# Patient Record
Sex: MALE | Race: WHITE | Employment: OTHER | URBAN - METROPOLITAN AREA
[De-identification: names, ages, dates, MRNs, and addresses within clinical notes are randomized per-mention and may not be internally consistent; named-entity substitution may affect disease eponyms.]

---

## 2017-02-08 ENCOUNTER — HOSPITAL ENCOUNTER (OUTPATIENT)
Dept: CT IMAGING | Facility: CLINIC | Age: 62
Discharge: HOME | End: 2017-02-08
Attending: FAMILY MEDICINE

## 2017-02-08 ENCOUNTER — HOSPITAL ENCOUNTER (OUTPATIENT)
Dept: CT IMAGING | Facility: CLINIC | Age: 62
Discharge: HOME | End: 2017-02-08

## 2017-02-08 DIAGNOSIS — R10.9 LEFT FLANK PAIN: ICD-10-CM

## 2017-02-20 ASSESSMENT — MIFFLIN-ST. JEOR: SCORE: 1705.72

## 2017-02-21 ENCOUNTER — ANESTHESIA - HEALTHEAST (OUTPATIENT)
Dept: SURGERY | Facility: HOSPITAL | Age: 62
End: 2017-02-21

## 2017-02-21 ASSESSMENT — MIFFLIN-ST. JEOR: SCORE: 1705.72

## 2017-02-22 ENCOUNTER — SURGERY - HEALTHEAST (OUTPATIENT)
Dept: SURGERY | Facility: HOSPITAL | Age: 62
End: 2017-02-22

## 2017-04-11 ENCOUNTER — RECORDS - HEALTHEAST (OUTPATIENT)
Dept: LAB | Facility: CLINIC | Age: 62
End: 2017-04-11

## 2017-04-11 LAB
CHOLEST SERPL-MCNC: 100 MG/DL
FASTING STATUS PATIENT QL REPORTED: YES
HDLC SERPL-MCNC: 33 MG/DL
LDLC SERPL CALC-MCNC: 55 MG/DL
TRIGL SERPL-MCNC: 59 MG/DL

## 2017-10-10 ENCOUNTER — RECORDS - HEALTHEAST (OUTPATIENT)
Dept: LAB | Facility: CLINIC | Age: 62
End: 2017-10-10

## 2017-10-10 LAB
CHOLEST SERPL-MCNC: 130 MG/DL
FASTING STATUS PATIENT QL REPORTED: YES
HDLC SERPL-MCNC: 36 MG/DL
LDLC SERPL CALC-MCNC: 72 MG/DL
PSA SERPL-MCNC: 3.5 NG/ML (ref 0–4.5)
TRIGL SERPL-MCNC: 109 MG/DL

## 2017-10-24 ENCOUNTER — RECORDS - HEALTHEAST (OUTPATIENT)
Dept: ADMINISTRATIVE | Facility: OTHER | Age: 62
End: 2017-10-24

## 2018-06-12 ENCOUNTER — RECORDS - HEALTHEAST (OUTPATIENT)
Dept: LAB | Facility: CLINIC | Age: 63
End: 2018-06-12

## 2018-06-12 LAB — TSH SERPL DL<=0.005 MIU/L-ACNC: 44.64 UIU/ML (ref 0.3–5)

## 2018-09-05 ENCOUNTER — RECORDS - HEALTHEAST (OUTPATIENT)
Dept: LAB | Facility: CLINIC | Age: 63
End: 2018-09-05

## 2018-09-05 LAB
ALBUMIN SERPL-MCNC: 3.2 G/DL (ref 3.5–5)
ALBUMIN UR-MCNC: ABNORMAL MG/DL
ALP SERPL-CCNC: 89 U/L (ref 45–120)
ALT SERPL W P-5'-P-CCNC: 24 U/L (ref 0–45)
ANION GAP SERPL CALCULATED.3IONS-SCNC: 9 MMOL/L (ref 5–18)
APPEARANCE UR: ABNORMAL
AST SERPL W P-5'-P-CCNC: 16 U/L (ref 0–40)
BACTERIA #/AREA URNS HPF: ABNORMAL HPF
BILIRUB SERPL-MCNC: 0.3 MG/DL (ref 0–1)
BILIRUB UR QL STRIP: NEGATIVE
BUN SERPL-MCNC: 11 MG/DL (ref 8–22)
CALCIUM SERPL-MCNC: 9 MG/DL (ref 8.5–10.5)
CAOX CRY #/AREA URNS HPF: PRESENT /[HPF]
CHLORIDE BLD-SCNC: 104 MMOL/L (ref 98–107)
CHOLEST SERPL-MCNC: 131 MG/DL
CO2 SERPL-SCNC: 30 MMOL/L (ref 22–31)
COLOR UR AUTO: YELLOW
CREAT SERPL-MCNC: 0.67 MG/DL (ref 0.7–1.3)
FASTING STATUS PATIENT QL REPORTED: NORMAL
GFR SERPL CREATININE-BSD FRML MDRD: >60 ML/MIN/1.73M2
GLUCOSE BLD-MCNC: 92 MG/DL (ref 70–125)
GLUCOSE UR STRIP-MCNC: NEGATIVE MG/DL
HDLC SERPL-MCNC: 57 MG/DL
HGB UR QL STRIP: NEGATIVE
HYALINE CASTS #/AREA URNS LPF: ABNORMAL LPF
KETONES UR STRIP-MCNC: ABNORMAL MG/DL
LDLC SERPL CALC-MCNC: 55 MG/DL
LEUKOCYTE ESTERASE UR QL STRIP: ABNORMAL
MUCOUS THREADS #/AREA URNS LPF: ABNORMAL LPF
NITRATE UR QL: NEGATIVE
PH UR STRIP: 5.5 [PH] (ref 4.5–8)
POTASSIUM BLD-SCNC: 3.8 MMOL/L (ref 3.5–5)
PROT SERPL-MCNC: 5.7 G/DL (ref 6–8)
PSA SERPL-MCNC: 1.8 NG/ML (ref 0–4.5)
RBC #/AREA URNS AUTO: ABNORMAL HPF
SODIUM SERPL-SCNC: 143 MMOL/L (ref 136–145)
SP GR UR STRIP: 1.04 (ref 1–1.03)
SQUAMOUS #/AREA URNS AUTO: ABNORMAL LPF
TRIGL SERPL-MCNC: 95 MG/DL
TSH SERPL DL<=0.005 MIU/L-ACNC: 13.99 UIU/ML (ref 0.3–5)
UROBILINOGEN UR STRIP-ACNC: ABNORMAL
WBC #/AREA URNS AUTO: ABNORMAL HPF

## 2018-10-05 ENCOUNTER — RECORDS - HEALTHEAST (OUTPATIENT)
Dept: LAB | Facility: CLINIC | Age: 63
End: 2018-10-05

## 2018-10-05 LAB
PSA SERPL-MCNC: 1.2 NG/ML (ref 0–4.5)
TSH SERPL DL<=0.005 MIU/L-ACNC: 8.93 UIU/ML (ref 0.3–5)

## 2018-11-09 ENCOUNTER — HOSPITAL ENCOUNTER (OUTPATIENT)
Dept: ULTRASOUND IMAGING | Facility: CLINIC | Age: 63
Discharge: HOME | End: 2018-11-09
Attending: INTERNAL MEDICINE | Admitting: INTERNAL MEDICINE
Payer: COMMERCIAL

## 2018-11-09 DIAGNOSIS — M79.661 BILATERAL CALF PAIN: ICD-10-CM

## 2018-11-09 DIAGNOSIS — R60.9 SWOLLEN: ICD-10-CM

## 2018-11-09 DIAGNOSIS — Z86.718 HISTORY OF DVT (DEEP VEIN THROMBOSIS): ICD-10-CM

## 2018-11-09 DIAGNOSIS — M79.662 BILATERAL CALF PAIN: ICD-10-CM

## 2018-11-09 DIAGNOSIS — L53.9 REDNESS: ICD-10-CM

## 2018-11-09 PROCEDURE — 93970 EXTREMITY STUDY: CPT

## 2018-11-12 ENCOUNTER — TRANSFERRED RECORDS (OUTPATIENT)
Dept: HEALTH INFORMATION MANAGEMENT | Facility: CLINIC | Age: 63
End: 2018-11-12

## 2018-11-14 ENCOUNTER — HOSPITAL ENCOUNTER (OUTPATIENT)
Dept: CARDIOLOGY | Facility: CLINIC | Age: 63
Discharge: HOME | End: 2018-11-14
Attending: INTERNAL MEDICINE | Admitting: INTERNAL MEDICINE
Payer: COMMERCIAL

## 2018-11-14 DIAGNOSIS — R60.9 EDEMA, UNSPECIFIED TYPE: ICD-10-CM

## 2018-11-14 DIAGNOSIS — Z85.118 HX OF CANCER OF LUNG: ICD-10-CM

## 2018-11-14 PROCEDURE — 93306 TTE W/DOPPLER COMPLETE: CPT | Mod: 26 | Performed by: INTERNAL MEDICINE

## 2018-11-14 PROCEDURE — 0399T ECHO COMPLETE: CPT

## 2018-11-23 ENCOUNTER — RECORDS - HEALTHEAST (OUTPATIENT)
Dept: LAB | Facility: CLINIC | Age: 63
End: 2018-11-23

## 2018-11-23 LAB
ALBUMIN SERPL-MCNC: 3.4 G/DL (ref 3.5–5)
ALP SERPL-CCNC: 105 U/L (ref 45–120)
ALT SERPL W P-5'-P-CCNC: 19 U/L (ref 0–45)
ANION GAP SERPL CALCULATED.3IONS-SCNC: 10 MMOL/L (ref 5–18)
AST SERPL W P-5'-P-CCNC: 21 U/L (ref 0–40)
BILIRUB SERPL-MCNC: 1 MG/DL (ref 0–1)
BNP SERPL-MCNC: 18 PG/ML (ref 0–53)
BUN SERPL-MCNC: 8 MG/DL (ref 8–22)
CALCIUM SERPL-MCNC: 9.4 MG/DL (ref 8.5–10.5)
CHLORIDE BLD-SCNC: 103 MMOL/L (ref 98–107)
CHOLEST SERPL-MCNC: 151 MG/DL
CO2 SERPL-SCNC: 28 MMOL/L (ref 22–31)
CREAT SERPL-MCNC: 0.68 MG/DL (ref 0.7–1.3)
FASTING STATUS PATIENT QL REPORTED: NORMAL
GFR SERPL CREATININE-BSD FRML MDRD: >60 ML/MIN/1.73M2
GLUCOSE BLD-MCNC: 101 MG/DL (ref 70–125)
HDLC SERPL-MCNC: 49 MG/DL
LDLC SERPL CALC-MCNC: 76 MG/DL
POTASSIUM BLD-SCNC: 3.9 MMOL/L (ref 3.5–5)
PROT SERPL-MCNC: 5.8 G/DL (ref 6–8)
SODIUM SERPL-SCNC: 141 MMOL/L (ref 136–145)
TRIGL SERPL-MCNC: 130 MG/DL
TSH SERPL DL<=0.005 MIU/L-ACNC: 6.32 UIU/ML (ref 0.3–5)

## 2018-12-07 ENCOUNTER — RECORDS - HEALTHEAST (OUTPATIENT)
Dept: ADMINISTRATIVE | Facility: OTHER | Age: 63
End: 2018-12-07

## 2018-12-07 ENCOUNTER — RECORDS - HEALTHEAST (OUTPATIENT)
Dept: LAB | Facility: CLINIC | Age: 63
End: 2018-12-07

## 2018-12-07 LAB
ALBUMIN SERPL-MCNC: 3.3 G/DL (ref 3.5–5)
ANION GAP SERPL CALCULATED.3IONS-SCNC: 9 MMOL/L (ref 5–18)
BUN SERPL-MCNC: 7 MG/DL (ref 8–22)
CALCIUM SERPL-MCNC: 9.1 MG/DL (ref 8.5–10.5)
CHLORIDE BLD-SCNC: 98 MMOL/L (ref 98–107)
CO2 SERPL-SCNC: 31 MMOL/L (ref 22–31)
CREAT SERPL-MCNC: 0.69 MG/DL (ref 0.7–1.3)
GFR SERPL CREATININE-BSD FRML MDRD: >60 ML/MIN/1.73M2
GLUCOSE BLD-MCNC: 102 MG/DL (ref 70–125)
PHOSPHATE SERPL-MCNC: 2.7 MG/DL (ref 2.5–4.5)
POTASSIUM BLD-SCNC: 4 MMOL/L (ref 3.5–5)
SODIUM SERPL-SCNC: 138 MMOL/L (ref 136–145)

## 2018-12-12 ENCOUNTER — AMBULATORY - HEALTHEAST (OUTPATIENT)
Dept: VASCULAR SURGERY | Facility: CLINIC | Age: 63
End: 2018-12-12

## 2018-12-12 DIAGNOSIS — R60.0 EDEMA LEG: ICD-10-CM

## 2018-12-18 ENCOUNTER — HOSPITAL ENCOUNTER (OUTPATIENT)
Dept: OCCUPATIONAL THERAPY | Facility: CLINIC | Age: 63
Setting detail: THERAPIES SERIES
End: 2018-12-18
Attending: INTERNAL MEDICINE
Payer: COMMERCIAL

## 2018-12-18 PROCEDURE — 97166 OT EVAL MOD COMPLEX 45 MIN: CPT | Mod: GO

## 2018-12-18 PROCEDURE — 97535 SELF CARE MNGMENT TRAINING: CPT | Mod: GO

## 2018-12-18 NOTE — PROGRESS NOTES
12/18/18 0900   Rehab Discipline   Discipline OT   Type of Visit   Type of visit Initial Edema Evaluation   General Information   Start of care 12/18/18   Referring physician Dr. Ziegler   Orders Evaluate and treat as indicated   Order date 11/13/18   Medical diagnosis non small cell lung cancer   Onset of illness / date of surgery 11/13/18   Edema onset 11/13/18   Affected body parts LLE;RLE;Trunk   Edema etiology Radiation;Chemo   Radiation comments Pt sustained radiation ealeier this year but was stopped 2/2 pneumitis. Pt stoppe    Chemotherapy comments Pt and spouse report 2 weeks chenotherapy earlier this year and was stopped 2/2 progression disease to his LN's and lymphatic system; 2/2 chemotherapy not working was discontinued for time being   Pertinent history of current problem (PT: include personal factors and/or comorbidities that impact the POC; OT: include additional occupational profile info) PMH significant for lung cancer, hernia, and thyroid issues.   Surgical / medical history reviewed Yes   Prior level of functional mobility I   Prior treatment Compression garments;Elevation;Diuretics   Community support Family / friend caregiver   Patient role / employment history Retired   Living environment [place] [facility]   General observations Pt on supplemntal O2   Fall Risk Screen   Fall screen completed by OT   Have you fallen 2 or more times in the past year? No   Have you fallen and had an injury in the past year? No   Is patient a fall risk? No   System Outcome Measures   Lymphedema Life Impact Scale (score range 0-72). A higher score indicates greater impairment. 40   Subjective Report   Patient report of symptoms limited movement at waist; heaviness in legs; hard to walk   Pain   Pain comments Pt has back and rib pain from tumor on his R side   Cognitive Status   Orientation Orientation to person, place and time   Level of consciousness Alert   Follows commands and answers questions 100% of the time    Personal safety and judgement Intact   Edema Exam / Assessment   Skin condition Pitting;Dryness;Intact   Skin condition comments 2-3+ pitting foot-mid calf; 2+ pitting mid calf- distal thigh; 1+ distal thigh to hip   Pitting 1+;2+;3+   Pitting location BLE's   Capillary refill Symmetrical   Dorsal pedal pulse comments unable to plapate-no signs ischemia noted   Stemmer sign Negative   Girth Measurements   Girth Measurements (will obtain upon return for services)   Range of Motion   ROM comments WFL   Strength   Strength comments NT'd   Activities of Daily Living   Activities of Daily Living I-Min A   Bed Mobility   Bed mobility I   Transfers   Transfers I   Gait / Locomotion   Gait / Locomotion I   Sensory   Sensory perception comments no neuropathy reported or identified   Coordination   Coordination Gross motor coordination appropriate   Muscle Tone   Muscle tone No deficits were identified   Planned Edema Interventions   Planned edema interventions Manual lymph drainage;Gradient compression bandaging;Fit for compression garment;Exercises;Precautions to prevent infection / exacerbation;Education;Skin care / precautions;Home management program development   Clinical Impression   Criteria for skilled therapeutic intervention met Yes   Therapy diagnosis lymphedema   Influenced by the following impairments / conditions Stage 1   Assessment of Occupational Performance 3-5 Performance Deficits   Identified Performance Deficits decreased functional mobility; decreased I with LB dressing; decreased I with functional transfers; decreased I with LB bathing   Clinical Decision Making (Complexity) Moderate complexity   Treatment frequency 3 times / week   Treatment duration 3x/wk x 2 weeks, then 0x/wk x 3 weeks, then 1x/wk x 1 week-just compression and ther ex; 3x/wk x 4 weeks, then 0x/wk x 3 weeks, then 1x/wk x 1 week if GCB + ther ex + MLD   Patient / family and/or staff in agreement with plan of care Yes   Risks and  benefits of therapy have been explained Yes   Clinical impression comments Pt will benefit from skilled lymphedema services to reduce BLE/trunkal lymphedema to aide improvements in LB cares, funcitonal mobility, and transfer I building.   Goals   Edema Eval Goals 1;2;3;4;5;6   Goal 1   Goal identifier 1   Goal description In order to improve functional mobility for activities of living, by the completion of intensive treatment,  patient and/or caregiver will:   Target date 03/01/19   Goal 2   Goal identifier 1a   Goal description tolerate gradient compression bandaging/wearing compression garments 23 hrs/day to prevent re-acccumulation of extracellular fluid for reductions needed to aide improvements in LB cares, functional mobility, and transfer Ind   Target date 03/01/19   Goal 3   Goal identifier 1b   Goal description demonstrate independence in applying gradient compression bandages to build I with home management of BLE/trunkal lymphedema needed to aide improvements in transfers, mobility, and LB cares   Target date 03/01/19   Goal 4   Goal identifier 1c   Goal description demonstrate independence in performing prescribed exercises to facilate the lymph system and muscle pumping system for max reductions needed to aide imporvements in functional mobility, transfers, and LB cares   Target date 03/01/19   Goal 5   Goal identifier 1d   Goal description be independent in donning/doffing, wearing schedule, and care of compression garments for I building with home management of BLE/trunkal lymphedema needed to aide improvements in mobility, LB cares, and transfer Ind   Target date 03/01/19   Goal 6   Goal identifier 2   Goal description Display total BLE volume reduction of 1L+ for reductions needed to aide improvements in mobility, LB cares, and functional transfers   Target date 03/01/19   Total Evaluation Time   OT Leon Moderate Complexity Minutes (52438) 26

## 2018-12-26 ENCOUNTER — COMMUNICATION - HEALTHEAST (OUTPATIENT)
Dept: VASCULAR SURGERY | Facility: CLINIC | Age: 63
End: 2018-12-26

## 2019-01-17 ENCOUNTER — HOME CARE/HOSPICE - HEALTHEAST (OUTPATIENT)
Dept: HOSPICE | Facility: HOSPICE | Age: 64
End: 2019-01-17

## 2019-01-18 ENCOUNTER — HOME CARE/HOSPICE - HEALTHEAST (OUTPATIENT)
Dept: HOSPICE | Facility: HOSPICE | Age: 64
End: 2019-01-18

## 2019-01-21 ENCOUNTER — RECORDS - HEALTHEAST (OUTPATIENT)
Dept: LAB | Facility: CLINIC | Age: 64
End: 2019-01-21

## 2019-01-21 LAB
ALBUMIN SERPL-MCNC: 2.3 G/DL (ref 3.5–5)
ALP SERPL-CCNC: 107 U/L (ref 45–120)
ALT SERPL W P-5'-P-CCNC: 15 U/L (ref 0–45)
ANION GAP SERPL CALCULATED.3IONS-SCNC: 12 MMOL/L (ref 5–18)
AST SERPL W P-5'-P-CCNC: 14 U/L (ref 0–40)
BILIRUB SERPL-MCNC: 0.3 MG/DL (ref 0–1)
BUN SERPL-MCNC: 7 MG/DL (ref 8–22)
CALCIUM SERPL-MCNC: 9 MG/DL (ref 8.5–10.5)
CHLORIDE BLD-SCNC: 102 MMOL/L (ref 98–107)
CO2 SERPL-SCNC: 27 MMOL/L (ref 22–31)
CREAT SERPL-MCNC: 0.62 MG/DL (ref 0.7–1.3)
GFR SERPL CREATININE-BSD FRML MDRD: >60 ML/MIN/1.73M2
GLUCOSE BLD-MCNC: 101 MG/DL (ref 70–125)
POTASSIUM BLD-SCNC: 3.9 MMOL/L (ref 3.5–5)
PROT SERPL-MCNC: 5.7 G/DL (ref 6–8)
SODIUM SERPL-SCNC: 141 MMOL/L (ref 136–145)
TSH SERPL DL<=0.005 MIU/L-ACNC: 0.8 UIU/ML (ref 0.3–5)

## 2019-01-22 ENCOUNTER — HOME CARE/HOSPICE - HEALTHEAST (OUTPATIENT)
Dept: HOSPICE | Facility: HOSPICE | Age: 64
End: 2019-01-22

## 2019-01-30 ENCOUNTER — HOME CARE/HOSPICE - HEALTHEAST (OUTPATIENT)
Dept: HOSPICE | Facility: HOSPICE | Age: 64
End: 2019-01-30

## 2019-02-26 ENCOUNTER — HOSPITAL ENCOUNTER (OUTPATIENT)
Facility: CLINIC | Age: 64
End: 2019-02-26

## 2019-03-06 ENCOUNTER — AMBULATORY - HEALTHEAST (OUTPATIENT)
Dept: OTHER | Facility: CLINIC | Age: 64
End: 2019-03-06

## 2019-03-06 ENCOUNTER — DOCUMENTATION ONLY (OUTPATIENT)
Dept: OTHER | Facility: CLINIC | Age: 64
End: 2019-03-06

## 2019-03-12 ENCOUNTER — DOCUMENTATION ONLY (OUTPATIENT)
Dept: OTHER | Facility: CLINIC | Age: 64
End: 2019-03-12

## 2019-03-12 ENCOUNTER — AMBULATORY - HEALTHEAST (OUTPATIENT)
Dept: OTHER | Facility: CLINIC | Age: 64
End: 2019-03-12

## 2019-03-14 ENCOUNTER — MEDICAL CORRESPONDENCE (OUTPATIENT)
Dept: HEALTH INFORMATION MANAGEMENT | Facility: CLINIC | Age: 64
End: 2019-03-14

## 2019-06-08 ENCOUNTER — HOSPITAL ENCOUNTER (INPATIENT)
Facility: CLINIC | Age: 64
LOS: 2 days | Discharge: HOSPICE | End: 2019-06-11
Attending: EMERGENCY MEDICINE | Admitting: INTERNAL MEDICINE
Payer: COMMERCIAL

## 2019-06-08 DIAGNOSIS — C34.90 METASTATIC NON-SMALL CELL LUNG CANCER (H): ICD-10-CM

## 2019-06-08 DIAGNOSIS — Z51.5 HOSPICE CARE PATIENT: ICD-10-CM

## 2019-06-08 DIAGNOSIS — R45.1 AGITATION: ICD-10-CM

## 2019-06-08 PROCEDURE — 96375 TX/PRO/DX INJ NEW DRUG ADDON: CPT

## 2019-06-08 PROCEDURE — 99285 EMERGENCY DEPT VISIT HI MDM: CPT

## 2019-06-08 PROCEDURE — 96372 THER/PROPH/DIAG INJ SC/IM: CPT

## 2019-06-08 PROCEDURE — 96374 THER/PROPH/DIAG INJ IV PUSH: CPT

## 2019-06-08 RX ORDER — LORAZEPAM 2 MG/ML
1 INJECTION INTRAMUSCULAR
Status: COMPLETED | OUTPATIENT
Start: 2019-06-08 | End: 2019-06-09

## 2019-06-08 RX ORDER — HYDROMORPHONE HYDROCHLORIDE 1 MG/ML
.5-1 INJECTION, SOLUTION INTRAMUSCULAR; INTRAVENOUS; SUBCUTANEOUS
Status: DISCONTINUED | OUTPATIENT
Start: 2019-06-08 | End: 2019-06-09

## 2019-06-08 RX ORDER — CHLORPROMAZINE HYDROCHLORIDE 25 MG/ML
50 INJECTION INTRAMUSCULAR ONCE
Status: COMPLETED | OUTPATIENT
Start: 2019-06-08 | End: 2019-06-09

## 2019-06-08 ASSESSMENT — MIFFLIN-ST. JEOR: SCORE: 1409.54

## 2019-06-08 NOTE — LETTER
[place] [address] [facility]  [address]  [facility] [address]  Phone: [phone]  Fax: [phone]      June 9, 2019      RE: Ramez Frye  [address] [facility] [address]        To whom it may concern:    Ramez Frye is under my professional care. He is on hospice care and is terminally ill. I would encourage family members to visit him if possible.    Sincerely,    Karol Mckeon MD  Staff Physician

## 2019-06-09 ENCOUNTER — CARE COORDINATION (OUTPATIENT)
Dept: CARE COORDINATION | Facility: CLINIC | Age: 64
End: 2019-06-09

## 2019-06-09 PROBLEM — C34.90 METASTATIC LUNG CANCER (METASTASIS FROM LUNG TO OTHER SITE) (H): Status: ACTIVE | Noted: 2019-06-09

## 2019-06-09 PROCEDURE — 25000132 ZZH RX MED GY IP 250 OP 250 PS 637: Performed by: INTERNAL MEDICINE

## 2019-06-09 PROCEDURE — 25000128 H RX IP 250 OP 636: Performed by: EMERGENCY MEDICINE

## 2019-06-09 PROCEDURE — 25000125 ZZHC RX 250: Performed by: INTERNAL MEDICINE

## 2019-06-09 PROCEDURE — 99222 1ST HOSP IP/OBS MODERATE 55: CPT | Mod: AI | Performed by: INTERNAL MEDICINE

## 2019-06-09 PROCEDURE — 12000000 ZZH R&B MED SURG/OB

## 2019-06-09 PROCEDURE — 25800030 ZZH RX IP 258 OP 636: Performed by: INTERNAL MEDICINE

## 2019-06-09 PROCEDURE — 25000128 H RX IP 250 OP 636: Performed by: INTERNAL MEDICINE

## 2019-06-09 RX ORDER — ONDANSETRON 4 MG/1
4 TABLET, ORALLY DISINTEGRATING ORAL EVERY 6 HOURS PRN
Status: DISCONTINUED | OUTPATIENT
Start: 2019-06-09 | End: 2019-06-11 | Stop reason: HOSPADM

## 2019-06-09 RX ORDER — MORPHINE SULFATE 2 MG/ML
1-2 INJECTION, SOLUTION INTRAMUSCULAR; INTRAVENOUS
Status: DISCONTINUED | OUTPATIENT
Start: 2019-06-09 | End: 2019-06-09

## 2019-06-09 RX ORDER — MORPHINE SULFATE 100 MG/5ML
5-10 SOLUTION ORAL
Status: DISCONTINUED | OUTPATIENT
Start: 2019-06-09 | End: 2019-06-09

## 2019-06-09 RX ORDER — LORAZEPAM 2 MG/ML
.5-1 INJECTION INTRAMUSCULAR
Status: DISCONTINUED | OUTPATIENT
Start: 2019-06-09 | End: 2019-06-10

## 2019-06-09 RX ORDER — ATROPINE SULFATE 10 MG/ML
1-2 SOLUTION/ DROPS OPHTHALMIC
Status: DISCONTINUED | OUTPATIENT
Start: 2019-06-09 | End: 2019-06-11 | Stop reason: HOSPADM

## 2019-06-09 RX ORDER — NALOXONE HYDROCHLORIDE 0.4 MG/ML
.1-.4 INJECTION, SOLUTION INTRAMUSCULAR; INTRAVENOUS; SUBCUTANEOUS
Status: DISCONTINUED | OUTPATIENT
Start: 2019-06-09 | End: 2019-06-11 | Stop reason: HOSPADM

## 2019-06-09 RX ORDER — HYDROMORPHONE HYDROCHLORIDE 1 MG/ML
0.5 INJECTION, SOLUTION INTRAMUSCULAR; INTRAVENOUS; SUBCUTANEOUS ONCE
Status: COMPLETED | OUTPATIENT
Start: 2019-06-09 | End: 2019-06-09

## 2019-06-09 RX ORDER — BISACODYL 10 MG
10 SUPPOSITORY, RECTAL RECTAL
Status: DISCONTINUED | OUTPATIENT
Start: 2019-06-12 | End: 2019-06-11 | Stop reason: HOSPADM

## 2019-06-09 RX ORDER — HALOPERIDOL 5 MG/ML
1-2 INJECTION INTRAMUSCULAR
Status: DISCONTINUED | OUTPATIENT
Start: 2019-06-09 | End: 2019-06-09

## 2019-06-09 RX ORDER — MORPHINE SULFATE 10 MG/5ML
5-10 SOLUTION ORAL
Status: DISCONTINUED | OUTPATIENT
Start: 2019-06-09 | End: 2019-06-09

## 2019-06-09 RX ORDER — ACETAMINOPHEN 650 MG/1
650 SUPPOSITORY RECTAL EVERY 4 HOURS PRN
Status: DISCONTINUED | OUTPATIENT
Start: 2019-06-09 | End: 2019-06-10

## 2019-06-09 RX ORDER — LORAZEPAM 0.5 MG/1
.5-1 TABLET ORAL
Status: DISCONTINUED | OUTPATIENT
Start: 2019-06-09 | End: 2019-06-10

## 2019-06-09 RX ORDER — HYDROMORPHONE HYDROCHLORIDE 1 MG/ML
.3-.5 INJECTION, SOLUTION INTRAMUSCULAR; INTRAVENOUS; SUBCUTANEOUS
Status: DISCONTINUED | OUTPATIENT
Start: 2019-06-09 | End: 2019-06-10

## 2019-06-09 RX ORDER — ONDANSETRON 2 MG/ML
4 INJECTION INTRAMUSCULAR; INTRAVENOUS EVERY 6 HOURS PRN
Status: DISCONTINUED | OUTPATIENT
Start: 2019-06-09 | End: 2019-06-11 | Stop reason: HOSPADM

## 2019-06-09 RX ORDER — SALIVA STIMULANT COMB. NO.3
2 SPRAY, NON-AEROSOL (ML) MUCOUS MEMBRANE
Status: DISCONTINUED | OUTPATIENT
Start: 2019-06-09 | End: 2019-06-11 | Stop reason: HOSPADM

## 2019-06-09 RX ORDER — CHLORPROMAZINE HYDROCHLORIDE 25 MG/ML
25 INJECTION INTRAMUSCULAR 4 TIMES DAILY PRN
Status: DISCONTINUED | OUTPATIENT
Start: 2019-06-09 | End: 2019-06-11 | Stop reason: HOSPADM

## 2019-06-09 RX ADMIN — HYDROMORPHONE HYDROCHLORIDE 0.5 MG: 1 INJECTION, SOLUTION INTRAMUSCULAR; INTRAVENOUS; SUBCUTANEOUS at 17:29

## 2019-06-09 RX ADMIN — ACETAMINOPHEN 650 MG: 650 SUPPOSITORY RECTAL at 10:01

## 2019-06-09 RX ADMIN — CHLORPROMAZINE HYDROCHLORIDE 25 MG: 25 INJECTION INTRAMUSCULAR at 22:53

## 2019-06-09 RX ADMIN — HYDROMORPHONE HYDROCHLORIDE 0.4 MG/HR: 10 INJECTION, SOLUTION INTRAMUSCULAR; INTRAVENOUS; SUBCUTANEOUS at 05:13

## 2019-06-09 RX ADMIN — HYDROMORPHONE HYDROCHLORIDE 0.5 MG: 1 INJECTION, SOLUTION INTRAMUSCULAR; INTRAVENOUS; SUBCUTANEOUS at 22:03

## 2019-06-09 RX ADMIN — LORAZEPAM 1 MG: 2 INJECTION INTRAMUSCULAR; INTRAVENOUS at 15:26

## 2019-06-09 RX ADMIN — CHLORPROMAZINE HYDROCHLORIDE 50 MG: 25 INJECTION INTRAMUSCULAR at 00:47

## 2019-06-09 RX ADMIN — HYDROMORPHONE HYDROCHLORIDE 0.5 MG: 1 INJECTION, SOLUTION INTRAMUSCULAR; INTRAVENOUS; SUBCUTANEOUS at 06:59

## 2019-06-09 RX ADMIN — ATROPINE SULFATE 2 DROP: 10 SOLUTION OPHTHALMIC at 21:04

## 2019-06-09 RX ADMIN — LORAZEPAM 1 MG: 2 INJECTION INTRAMUSCULAR; INTRAVENOUS at 00:48

## 2019-06-09 RX ADMIN — ATROPINE SULFATE 2 DROP: 10 SOLUTION OPHTHALMIC at 17:48

## 2019-06-09 RX ADMIN — LORAZEPAM 1 MG: 2 INJECTION INTRAMUSCULAR; INTRAVENOUS at 21:03

## 2019-06-09 RX ADMIN — ATROPINE SULFATE 2 DROP: 10 SOLUTION OPHTHALMIC at 12:39

## 2019-06-09 RX ADMIN — HYDROMORPHONE HYDROCHLORIDE 0.5 MG: 1 INJECTION, SOLUTION INTRAMUSCULAR; INTRAVENOUS; SUBCUTANEOUS at 10:18

## 2019-06-09 RX ADMIN — ATROPINE SULFATE 2 DROP: 10 SOLUTION OPHTHALMIC at 10:09

## 2019-06-09 RX ADMIN — HYDROMORPHONE HYDROCHLORIDE 0.5 MG: 1 INJECTION, SOLUTION INTRAMUSCULAR; INTRAVENOUS; SUBCUTANEOUS at 00:19

## 2019-06-09 ASSESSMENT — ENCOUNTER SYMPTOMS: AGITATION: 1

## 2019-06-09 ASSESSMENT — ACTIVITIES OF DAILY LIVING (ADL)
ADLS_ACUITY_SCORE: 32
ADLS_ACUITY_SCORE: 24
ADLS_ACUITY_SCORE: 24
ADLS_ACUITY_SCORE: 34
ADLS_ACUITY_SCORE: 34

## 2019-06-09 NOTE — PLAN OF CARE
Continue with dilaudid drip, 1 PRN dose of dilaudid given.  Atropine x1, 2L NC, suction mouth as patient tolerates  Incontinent  100.9- rectal tylenol x1  Nursing will continue to provide comfort cares

## 2019-06-09 NOTE — PROGRESS NOTES
Family waiting on feedback about pain control meds for pt. Requesting for dilaudid IV as discussed. MD paged.    Addendum: Dr. Breen called back.

## 2019-06-09 NOTE — PROGRESS NOTES
Writer and RN Rayne RUIZ, made visit to patient at [facility], following an admission due to severe agitation.  Wife reported that patient only calmed at 5 AM and she went home to rest.  Ben is now comfortable and sleeping.  His breathing is shallow and congested.  No assessment done by hospice nurse, with the goal to let him rest and not become agitated.  Rayne left VM message for wife, after visit, informing her that he was comfortable and sleeping.  Wife home resting after a long night.  Hospice team will assess needs tomorrow.  Contact hospice at [phone] with any changes.  Thank you for your care.

## 2019-06-09 NOTE — ED NOTES
"[facility]  ED Nurse Handoff Report    Ramez Frye is a 63 year old male   ED Chief complaint: Agitation  . ED Diagnosis:   Final diagnoses:   Hospice care patient   Agitation   Metastatic non-small cell lung cancer (H)     Allergies:   Allergies   Allergen Reactions     Penicillins Rash     Has used amoxicillin without problem         Code Status: DNR / DNI (Hospice)  Activity level - Baseline/Home:  Total Care. Activity Level - Current:   Total Care. Lift room needed: Yes. Bariatric: No   Needed: No   Isolation: No. Infection: Not Applicable.     Vital Signs:   Vitals:    06/08/19 2331   BP: (!) 85/63   Pulse: 83   Resp: 20   Temp: 98.2  F (36.8  C)   TempSrc: Temporal   SpO2: 99%   Weight: 63.5 kg (140 lb)   Height: 1.727 m (5' 8\")       Cardiac Rhythm:  ,      Pain level:    Patient confused: unable to assess (pt very drowsy). Patient Falls Risk: Yes.   Elimination Status: Pt has not voided in ED   Patient Report - Initial Complaint: agitation. Focused Assessment: Per wife and family (pt lives with wife) pt has been having increased agitation and uncontrolled pain since Friday. Pt is a hospice patient for lung cancer with metastasis to the lymph nodes. Pt has a nurse that will come to the house and assist with cares. Pt has been taking morphine and ativan at home for pain in liquid form. Pt has been to weak to walk recently at home. Pt drowsy in ER. Pt's BP has been soft in the ER with systolic in the 80's and diastolic in the 50-60's. Pt normally on 2 liters oxygen NC at home but has been on 4 liters NC in ER due to lower oyxgen sats. Pt has been satting in the upper 90's.   Tests Performed: n/a. Abnormal Results: n/a.   Treatments provided: dilaudid, ativan, thorazine  Family Comments: Wife and sister at bedside and aware of plan of care  OBS brochure/video discussed/provided to patient:  N/A  ED Medications:   Medications   HYDROmorphone (PF) (DILAUDID) injection 0.5-1 mg (0.5 mg " Intravenous Given 6/9/19 0019)   chlorproMAZINE (THORAZINE) injection 50 mg (has no administration in time range)   LORazepam (ATIVAN) injection 1 mg (has no administration in time range)   HYDROmorphone PF (DILAUDID) 0.4 mg/mL in sodium chloride 0.9 % 50 mL infusion bag (has no administration in time range)     Drips infusing:  Yes  For the majority of the shift, the patient's behavior Green. Interventions performed were n/a.     Severe Sepsis OR Septic Shock Diagnosis Present: No      ED Nurse Name/Phone Number: Beverly Rinaldi,   12:42 AM  RECEIVING UNIT ED HANDOFF REVIEW    Above ED Nurse Handoff Report was reviewed: Yes  Reviewed by: Luara Cade on June 9, 2019 at 1:25 AM

## 2019-06-09 NOTE — PROGRESS NOTES
Pt family states he is supposed to be on dilaudid continuous IV per understanding that morphine not effective on his pain. Orders in MAR are from ER not inpatient per pharmacy. MD notified.

## 2019-06-09 NOTE — CONSULTS
Care Transition Initial Assessment - SW     Met with: Patient and Family    Active Problems:    Metastatic lung cancer (metastasis from lung to other site) (H)       DATA  Lives With: spouse      Quality of Family Relationships: helpful, involved, supportive  Description of Support System: Supportive, Involved  Who is your support system?: Wife  Support Assessment: Adequate family and caregiver support, Adequate social supports.   Identified issues/concerns regarding health management: Discharge planning      Quality of Family Relationships: helpful, involved, supportive  ASSESSMENT  Cognitive Status:  non-responsive  Concerns to be addressed: Patient resides with his spouse. Patient is enrolled in  hospice. Wife reported herself and son has been providing all cares for patient. Pt wife and son reported they are not sure if they can adequately care for pt at lone due to agitation and pain. Pt wife reported if his pain and agitation can me manage then pt preference is to die at home. Discussed hospice homes. Patient wife reported she need to talk to hospice nurse Ana at [phone] on monday for her recommends.  hospice following patient.     PLAN  Continue to follow for discharge planning.

## 2019-06-09 NOTE — PLAN OF CARE
O2 @ 3L sating in the 80's  LS course  Secretions managed with Atropine gtts/Yanker suction  Pt lethargic/somnolent most of shift  PIV infusing continuous IV Dilaudid 0.4/hr  NPO with ice and meds  Assist of 2 with repo/changing  Pt's family does not want pt repo on sides  Pt agitated at times while family here, Ativan given  Plan: RIVERA talking with wife for Hospice placement

## 2019-06-09 NOTE — PROGRESS NOTES
.  [facility]    Hospitalist Progress Note  Name: Ramez Frye    MRN: [id number]  Provider: Karol Mckeon MD  Date of Service: 06/09/2019    Assessment & Plan   Summary of Stay: Rmaez Frye is a 63 year old male with past medical history significant for stage IV non-small cell cancer previously treated with chemo radiation and Keytruda developed pulmonary toxicity and with worsening clinical situation was made hospice.  Patient was managed at home under supervision of hospice team however patient was increasingly agitated and required high doses of pain meds.  He was sent via EMS to help with comfort cares as inpatient.  Admitted for comfort cares    Stage IV non-small cell cancer  --Patient now on comfort care as an under hospice care  --Dilaudid infusion and as needed boluses for agitation and comfort  --Thorazine IM for agitation as recommended by hospice director  --Social work consulted for residential hospice plans    History of CAD  Patient is on hospice no acute assessment        DVT Prophylaxis: Not indicated  Code Status: DNR/DNI    Disposition: Expected discharge social work consulted to help with residential hospice care      Interval History   Assumed care reviewed chart    -Data reviewed today: I reviewed all new labs and imaging reports over the last 24 hours. I personally reviewed no images or EKG's today.    Physical Exam   Temp: 98.2  F (36.8  C) Temp src: Temporal BP: 96/61 Pulse: 99 Heart Rate: 87 Resp: 20 SpO2: 94 % O2 Device: Nasal cannula Oxygen Delivery: 2 LPM  Vitals:    06/08/19 2331   Weight: 63.5 kg (140 lb)     Vital Signs with Ranges  Temp:  [98.2  F (36.8  C)] 98.2  F (36.8  C)  Pulse:  [83-99] 99  Heart Rate:  [83-87] 87  Resp:  [20] 20  BP: (85-96)/(61-63) 96/61  SpO2:  [94 %-99 %] 94 %  No intake/output data recorded.    Gen: Somnolent and lethargic but appears comfortable.  Otherwise, in no acute distress.  HEENT: NCAT. EOMI. PERRL.  Neck: Normal inspection. No  bruit, JVD or thyromegaly.  Lungs: Normal respiratory effort.  Story but sounds clear  Card: N s1s2. RRR. No M/R/G.  Peripheral pulses present and symmetric.   Skin: No rash. Warm to the touch  Extr: No edema. CMS intact  Psychiatric: Unable to assess  Neurologic: Unable to assess        Medications     - MEDICATION INSTRUCTIONS -       HYDROmorphone 0.4 mg/hr (06/09/19 0731)       Data     No results for input(s): WBC, HGB, HCT, MCV, PLT in the last 168 hours.  No results for input(s): NA, POTASSIUM, CHLORIDE, CO2, ANIONGAP, GLC, BUN, CR, GFRESTIMATED, GFRESTBLACK, HITESH in the last 168 hours.  No results for input(s): CULT in the last 168 hours.  No results for input(s): NTBNPI, NTBNP in the last 168 hours.  No results for input(s): AST, ALT, GGT, ALKPHOS, BILITOTAL, BILICONJ, BILIDIRECT, ALYSSA in the last 168 hours.    Invalid input(s): BILIRUBININDIRECT  No results for input(s): INR in the last 168 hours.  No results for input(s): LACT in the last 168 hours.  No results for input(s): LIPASE in the last 168 hours.  No results for input(s): TROPONIN, TROPI, TROPR in the last 168 hours.    Invalid input(s): TROP, TROPONINIES  No results for input(s): COLOR, APPEARANCE, URINEGLC, URINEBILI, URINEKETONE, SG, UBLD, URINEPH, PROTEIN, UROBILINOGEN, NITRITE, LEUKEST, RBCU, WBCU in the last 168 hours.    No results found for this or any previous visit (from the past 24 hour(s)).

## 2019-06-09 NOTE — PHARMACY-ADMISSION MEDICATION HISTORY
Admission medication history interview status for this patient is complete. See [facility] admission navigator for allergy information, prior to admission medications and immunization status.     Medication history interview source(s):Patient comfort cares/hospice  Medication history resources (including written lists, pill bottles, clinic record):None  Primary pharmacy: n/a    Changes made to PTA medication list:  Added: none  Deleted: none  Changed: none    Actions taken by pharmacist (provider contacted, etc):None     Additional medication history information:None    Medication reconciliation/reorder completed by provider prior to medication history? No    Do you take OTC medications (eg tylenol, ibuprofen, fish oil, eye/ear drops, etc)? N(Y/N)    For patients on insulin therapy: N (Y/N)    **No home meds besides hospice meds      Prior to Admission medications    Not on File

## 2019-06-09 NOTE — H&P
[facility]  Hospitalist Admission Note  June 9, 2019  Name: Ramez Frye    MRN: [id number]  YOB: 1955    Age: 63 year old  Date of admission: 6/8/2019  Primary care provider: Ty Carter      Summary:  Patient is a 63-year-old male with a history of tobaccoism, coronary artery disease status post MI, and of significance, stage IV non-small cell cancer status post previous chemoradiation treatment as well as Keytruda which was felt to have caused some pulmonary toxicity last year and currently on hospice who presents here for uncontrolled symptoms.  Patient is followed by hospice and over the last 24 to 48 hours, patient has continued to have persistent agitation, restlessness, and pain despite the usual conventional treatment for comfort cares.  Thus, the family brought the patient in for better symptom management.  On my current interview, patient is quite somnolent but appears comfortable.  Wife and daughter are at the bedside.  They both reaffirmed that they want better symptom management and reports that this has been the most comfortable the patient has been in the last 24 to 48 hours.  I was asked to admit the patient for further symptom management and discuss appropriate disposition.     Problem list/Plan:  1. Stage IV non-small cell lung cancer: Currently on hospice prior to admission.  Will make patient comfort cares while in the hospital.  Will order Dilaudid infusion with as needed boluses.  Per hospice medical director, they would prefer Thorazine IM for agitation instead.  Also will have the usual conventional comfort medications.  DNR/DNI.  Will request  consultation to discuss disposition planning; specifically, possible residential hospice instead of home unless family is comfortable going back on home with home hospice.  2. History of coronary artery disease: Not an active issue at this time  3. FYI: Patient needs to be rolled  to his left for personal hygiene as his right shoulder is very tender if rolled to the right.      -Additional workup plan which will include  consultation    All lab work and imaging data independently reviewed by myself    Prophylaxis;  Not indicated as patient is on hospice/Ambulation.     Code status: DNR/DNI    Discharge: To be determined    Chief Complaint:   Persistent pain/agitation   HPI  Patient is a 63-year-old male with a history of tobaccoism, coronary artery disease status post MI, and of significance, stage IV non-small cell cancer status post previous chemoradiation treatment as well as Keytruda which was felt to have caused some pulmonary toxicity last year and currently on hospice who presents here for uncontrolled symptoms.  Patient is followed by hospice and over the last 24 to 48 hours, patient has continued to have persistent agitation, restlessness, and pain despite the usual conventional treatment for comfort cares.  Thus, the family brought the patient in for better symptom management.  On my current interview, patient is quite somnolent but appears comfortable.  Wife and daughter are at the bedside.  They both reaffirmed that they want better symptom management and reports that this has been the most comfortable the patient has been in the last 24 to 48 hours.  I was asked to admit the patient for further symptom management and discuss appropriate disposition.  I did discuss the case in detail with the ED physician.     Past Medical History:     Past Medical History:   Diagnosis Date     Cancer (H)      Myocardial infarction (H)    Non-small cell stage IV lung cancer  Past Surgical History:   No past surgical history on file.  Social History:     Social History     Tobacco Use     Smoking status: None   Substance Use Topics     Alcohol use: None     Drug use: None     Family History:  Family history reviewed. NO pertinent family history     Allergies:     Allergies   Allergen  "Reactions     Penicillins Rash     Has used amoxicillin without problem       Medications:     No medications prior to admission.       Review of Systems:   A Comprehensive greater than 10 system review of systems was carried out.  Pertinent positives and negatives are noted above.  Otherwise negative for contributory information.        Physical Exam:  Blood pressure 96/61, pulse 99, temperature 98.2  F (36.8  C), temperature source Temporal, resp. rate 20, height 1.727 m (5' 8\"), weight 63.5 kg (140 lb), SpO2 97 %.  Gen: Somnolent and lethargic but appears comfortable.  Otherwise, in no acute distress.  HEENT: NCAT. EOMI. PERRL.  Neck: Normal inspection. No bruit, JVD or thyromegaly.  Lungs: Normal respiratory effort.  Story but sounds clear  Card: N s1s2. RRR. No M/R/G.  Peripheral pulses present and symmetric.   Skin: No rash. Warm to the touch  Extr: No edema. CMS intact  Psychiatric: Unable to assess  Neurologic: Unable to assess    Data:   No results for input(s): WBC, HGB, HCT, MCV, PLT in the last 168 hours.  No results for input(s): NA, POTASSIUM, CHLORIDE, CO2, ANIONGAP, GLC, BUN, CR, GFRESTIMATED, GFRESTBLACK, HITESH, MAG, PHOS, PROTTOTAL, ALBUMIN, BILITOTAL, ALKPHOS, AST, ALT in the last 168 hours.    Imaging:   No results found for this or any previous visit (from the past 24 hour(s)).    Venancio Breen MD Pager [phone]      "

## 2019-06-09 NOTE — ED TRIAGE NOTES
Pt arrives to the ED via EMS from home for hospice. Pt has h/o of lung cancer with metastasis to lymph nodes. Per wife pt has been more agitated since Friday. Pt has been taking morphine and ativan at home. Pt has been having increased pain as well especially on right side per family. Pt lives with wife at home.

## 2019-06-09 NOTE — PLAN OF CARE
Pt on comfort cares.  Pt somnolent, moans w/cares, appears comfortable.  On continuous IV dilaudid.   On O2  for comfort.  Pressure wound to sacral area, mepilex applied.  Incontinent, changed x1.  Per spouse request pt not to be repositioned & during check & change, turn left side only.

## 2019-06-09 NOTE — ED PROVIDER NOTES
"  History     Chief Complaint:  Agitation    HPI     Ramez Frye is a 63 year old male on hospice and 2L home O2 for NSCLC who presents with family via EMS with agitation. Ramez has been treated with home hospice, receiving sublingual morphine, Ativan, and Haldol for pain and agitation. Unfortunately today, despite increases in doses, family has been unable to adequately control his symptoms. Most recently he received 40 mg morphine and 4 mg Ativan sublingual. Hospice physician recommended family call EMS for transport to ER for IV placement and admission to better control symptoms. Patient's family's only concern is that Ramez is comfortable and without pain at the end of his life.     Allergies:  Penicillins    Medications:    Morphine   Ativan  Haldol    Past Medical History:    CAD  HLD  HTN  Nephrolithiasis  Non small cell lung cancer    Past Surgical History:    Vasectomy  ENT  CT biopsy adrenal  Hernia repair    Family History:    No past pertinent family history.    Social History:  Presents with wife and daughter who have been his primary caregivers.    Review of Systems   Unable to perform ROS: Patient unresponsive   Psychiatric/Behavioral: Positive for agitation.     Physical Exam     Patient Vitals for the past 24 hrs:   BP Temp Temp src Pulse Heart Rate Resp SpO2 Height Weight   06/09/19 0155 -- -- -- -- -- -- 97 % -- --   06/09/19 0115 96/61 -- -- 99 -- -- 97 % -- --   06/08/19 2331 (!) 85/63 98.2  F (36.8  C) Temporal 83 83 20 99 % 1.727 m (5' 8\") 63.5 kg (140 lb)       Physical Exam  General: Well-developed and well-nourished. Chronically ill appearing middle aged man. Appears older than stated age.   Head:  Atraumatic.  ENT:    Normal nose.   Neck:  Supple. Normal range of motion.  CV:  Regular rate and rhythm. Normal heart sounds with no murmurs, rubs, or gallops detected.  Resp:  No respiratory distress. NC O2 in place.  GI:  Non-distended.   Skin:  Warm. Non-diaphoretic. No " pallor.  Neuro: Sedated.  Unresponsive to verbal stimuli and light painful stimuli. Further exam deferred for patient's comfort.  Psych:  Unable to assess.  Vitals reviewed.    Emergency Department Course   Interventions:    The patient's symptoms were improved with parenteral narcotics.  0019 Dilaudid 0.5 mg IV  0047 Thorazine 50 mg IM  0048 Ativan 1 mg IV    Emergency Department Course:  Nursing notes and vitals reviewed. (6874) I performed an exam of the patient as documented above.      Medicine administered as documented above.     ( 0027)  I consulted with Dr. Breen of the hospitalist service. He is in agreement to accept the patient for admission.    Findings and plan explained to the family who consents to admission. Discussed the patient with Dr. Breen, who will admit the patient to a medical bed for further monitoring, evaluation, and treatment.    Impression & Plan      Medical Decision Making:  Ramez is a 63-year-old man with metastatic non-small cell lung cancer on hospice who was sent in by hospice provider, Dr. Lemon, because patient's family cannot care for him any longer at home.  He has been quite agitated and has been treated with sublingual morphine, Haldol, and Ativan with limited success.  Patient is fortunately sedated and not agitated on exam.  Family is very concerned with his comfort and would like him to be pain-free.  As such, an IV was placed for medication administration though any work-up is not indicated given his end-of-life goals.  Per Dr. Lemon's recommendations I ordered Dilaudid 0.5 mg/h continuous infusion.  We could not start this in the emergency department so instead patient was treated with 0.5 to 1 mg of Dilaudid every 1 hour.  In addition, also per hospice physician recommendation, patient will be treated with 50 mg of Thorazine every 4 hours and 1 mg of Ativan every 1 hour as needed for agitation.  I updated the patient's family on this plan as well as plan for admission  for care as they cannot currently treat at home.  All their questions were answered they verbalized understanding.  Amenable to plan.  I discussed patient's case with Dr. Breen, hospitalist, who accepts admission and has no further orders.    Diagnosis:    ICD-10-CM    1. Hospice care patient Z51.5    2. Agitation R45.1    3. Metastatic non-small cell lung cancer (H) C34.90        Disposition:  Admitted to medical bed under care of Dr. Breen.       Scribe Disclosure:  I, Justin Gomez, am serving as a scribe on 6/8/2019 at 11:43 PM to personally document services performed by Denisa Bedoya MD based on my observations and the provider's statements to me.     Justin Gomez  6/8/2019   [facility] EMERGENCY DEPARTMENT       Denisa Bedoya MD  06/12/19 5240

## 2019-06-10 ENCOUNTER — MEDICAL CORRESPONDENCE (OUTPATIENT)
Dept: HEALTH INFORMATION MANAGEMENT | Facility: CLINIC | Age: 64
End: 2019-06-10

## 2019-06-10 PROCEDURE — 25000128 H RX IP 250 OP 636: Performed by: INTERNAL MEDICINE

## 2019-06-10 PROCEDURE — 25000132 ZZH RX MED GY IP 250 OP 250 PS 637: Performed by: INTERNAL MEDICINE

## 2019-06-10 PROCEDURE — 12000000 ZZH R&B MED SURG/OB

## 2019-06-10 PROCEDURE — 99207 ZZC CDG-CODE CATEGORY CHANGED: CPT | Performed by: INTERNAL MEDICINE

## 2019-06-10 PROCEDURE — 25000132 ZZH RX MED GY IP 250 OP 250 PS 637: Performed by: CLINICAL NURSE SPECIALIST

## 2019-06-10 PROCEDURE — 99239 HOSP IP/OBS DSCHRG MGMT >30: CPT | Mod: GV | Performed by: INTERNAL MEDICINE

## 2019-06-10 PROCEDURE — 99223 1ST HOSP IP/OBS HIGH 75: CPT | Mod: GV | Performed by: CLINICAL NURSE SPECIALIST

## 2019-06-10 PROCEDURE — 99207 ZZC CONSULT E&M CHANGED TO INITIAL LEVEL: CPT | Performed by: CLINICAL NURSE SPECIALIST

## 2019-06-10 PROCEDURE — 25800030 ZZH RX IP 258 OP 636: Performed by: INTERNAL MEDICINE

## 2019-06-10 RX ORDER — LORAZEPAM 2 MG/ML
2 INJECTION INTRAMUSCULAR
Status: DISCONTINUED | OUTPATIENT
Start: 2019-06-10 | End: 2019-06-10

## 2019-06-10 RX ORDER — ONDANSETRON 4 MG/1
4 TABLET, ORALLY DISINTEGRATING ORAL EVERY 6 HOURS PRN
Qty: 30 TABLET | Refills: 0 | Status: SHIPPED | OUTPATIENT
Start: 2019-06-10 | End: 2019-06-10

## 2019-06-10 RX ORDER — LORAZEPAM 2 MG/ML
1 INJECTION INTRAMUSCULAR ONCE
Status: COMPLETED | OUTPATIENT
Start: 2019-06-10 | End: 2019-06-10

## 2019-06-10 RX ORDER — LORAZEPAM 1 MG/1
1 TABLET ORAL
Status: DISCONTINUED | OUTPATIENT
Start: 2019-06-10 | End: 2019-06-11 | Stop reason: HOSPADM

## 2019-06-10 RX ORDER — LORAZEPAM 0.5 MG/1
.5-1 TABLET ORAL
Status: DISCONTINUED | OUTPATIENT
Start: 2019-06-10 | End: 2019-06-10

## 2019-06-10 RX ORDER — LORAZEPAM 2 MG/ML
2 INJECTION INTRAMUSCULAR
Status: DISCONTINUED | OUTPATIENT
Start: 2019-06-10 | End: 2019-06-11 | Stop reason: HOSPADM

## 2019-06-10 RX ORDER — SALIVA STIMULANT COMB. NO.3
2 SPRAY, NON-AEROSOL (ML) MUCOUS MEMBRANE
Qty: 1 BOTTLE | Refills: 0 | Status: SHIPPED | OUTPATIENT
Start: 2019-06-10 | End: 2019-06-10

## 2019-06-10 RX ORDER — HYDROMORPHONE HYDROCHLORIDE 1 MG/ML
.7-.8 INJECTION, SOLUTION INTRAMUSCULAR; INTRAVENOUS; SUBCUTANEOUS
Status: DISCONTINUED | OUTPATIENT
Start: 2019-06-10 | End: 2019-06-10

## 2019-06-10 RX ORDER — HYDROMORPHONE HYDROCHLORIDE 10 MG/ML
12 INJECTION INTRAMUSCULAR; INTRAVENOUS; SUBCUTANEOUS EVERY 4 HOURS
Status: DISCONTINUED | OUTPATIENT
Start: 2019-06-10 | End: 2019-06-11 | Stop reason: HOSPADM

## 2019-06-10 RX ORDER — ACETAMINOPHEN 650 MG/1
650 SUPPOSITORY RECTAL EVERY 4 HOURS PRN
Status: DISCONTINUED | OUTPATIENT
Start: 2019-06-10 | End: 2019-06-11 | Stop reason: HOSPADM

## 2019-06-10 RX ORDER — HYDROMORPHONE HYDROCHLORIDE 10 MG/ML
12 INJECTION INTRAMUSCULAR; INTRAVENOUS; SUBCUTANEOUS EVERY 4 HOURS
Qty: 30 ML | Refills: 0 | Status: SHIPPED | OUTPATIENT
Start: 2019-06-10

## 2019-06-10 RX ORDER — ACETAMINOPHEN 650 MG/1
650 SUPPOSITORY RECTAL EVERY 4 HOURS PRN
Qty: 30 SUPPOSITORY | Refills: 0 | Status: SHIPPED | OUTPATIENT
Start: 2019-06-10 | End: 2019-06-10

## 2019-06-10 RX ORDER — BISACODYL 10 MG
10 SUPPOSITORY, RECTAL RECTAL
Qty: 30 SUPPOSITORY | Refills: 0 | Status: SHIPPED | OUTPATIENT
Start: 2019-06-12 | End: 2019-06-10

## 2019-06-10 RX ORDER — ATROPINE SULFATE 10 MG/ML
1-2 SOLUTION/ DROPS OPHTHALMIC
Qty: 1 BOTTLE | Refills: 0 | Status: SHIPPED | OUTPATIENT
Start: 2019-06-10 | End: 2019-06-10

## 2019-06-10 RX ORDER — ACETAMINOPHEN 650 MG/1
650 SUPPOSITORY RECTAL EVERY 4 HOURS PRN
Qty: 30 SUPPOSITORY | Refills: 0 | Status: SHIPPED | OUTPATIENT
Start: 2019-06-10

## 2019-06-10 RX ORDER — BISACODYL 10 MG
10 SUPPOSITORY, RECTAL RECTAL
Qty: 30 SUPPOSITORY | Refills: 0 | Status: SHIPPED | OUTPATIENT
Start: 2019-06-12

## 2019-06-10 RX ORDER — LORAZEPAM 2 MG/ML
1 CONCENTRATE ORAL EVERY 4 HOURS
Qty: 30 ML | Refills: 0 | Status: SHIPPED | OUTPATIENT
Start: 2019-06-10

## 2019-06-10 RX ORDER — HYDROMORPHONE HYDROCHLORIDE 1 MG/ML
1 INJECTION, SOLUTION INTRAMUSCULAR; INTRAVENOUS; SUBCUTANEOUS
Status: DISCONTINUED | OUTPATIENT
Start: 2019-06-10 | End: 2019-06-11 | Stop reason: HOSPADM

## 2019-06-10 RX ORDER — LORAZEPAM 0.5 MG/1
.5-1 TABLET ORAL
Status: DISCONTINUED | OUTPATIENT
Start: 2019-06-10 | End: 2019-06-11 | Stop reason: HOSPADM

## 2019-06-10 RX ORDER — HYDROMORPHONE HYDROCHLORIDE 10 MG/ML
4 INJECTION INTRAMUSCULAR; INTRAVENOUS; SUBCUTANEOUS
Status: DISCONTINUED | OUTPATIENT
Start: 2019-06-10 | End: 2019-06-11 | Stop reason: HOSPADM

## 2019-06-10 RX ADMIN — HYDROMORPHONE HYDROCHLORIDE 0.5 MG: 1 INJECTION, SOLUTION INTRAMUSCULAR; INTRAVENOUS; SUBCUTANEOUS at 07:30

## 2019-06-10 RX ADMIN — HYDROMORPHONE HYDROCHLORIDE 0.72 MG/HR: 10 INJECTION, SOLUTION INTRAMUSCULAR; INTRAVENOUS; SUBCUTANEOUS at 11:43

## 2019-06-10 RX ADMIN — HYDROMORPHONE HYDROCHLORIDE 1 MG: 1 INJECTION, SOLUTION INTRAMUSCULAR; INTRAVENOUS; SUBCUTANEOUS at 13:29

## 2019-06-10 RX ADMIN — LORAZEPAM 1 MG: 2 LIQUID ORAL at 22:30

## 2019-06-10 RX ADMIN — HYDROMORPHONE HYDROCHLORIDE 0.5 MG: 1 INJECTION, SOLUTION INTRAMUSCULAR; INTRAVENOUS; SUBCUTANEOUS at 02:48

## 2019-06-10 RX ADMIN — HYDROMORPHONE HYDROCHLORIDE 12 MG: 10 INJECTION, SOLUTION INTRAMUSCULAR; INTRAVENOUS; SUBCUTANEOUS at 19:12

## 2019-06-10 RX ADMIN — HYDROMORPHONE HYDROCHLORIDE 0.5 MG: 1 INJECTION, SOLUTION INTRAMUSCULAR; INTRAVENOUS; SUBCUTANEOUS at 04:24

## 2019-06-10 RX ADMIN — HYDROMORPHONE HYDROCHLORIDE 12 MG: 10 INJECTION, SOLUTION INTRAMUSCULAR; INTRAVENOUS; SUBCUTANEOUS at 22:30

## 2019-06-10 RX ADMIN — LORAZEPAM 1 MG: 2 LIQUID ORAL at 15:24

## 2019-06-10 RX ADMIN — LORAZEPAM 1 MG: 0.5 TABLET ORAL at 08:16

## 2019-06-10 RX ADMIN — HYDROMORPHONE HYDROCHLORIDE 12 MG: 10 INJECTION, SOLUTION INTRAMUSCULAR; INTRAVENOUS; SUBCUTANEOUS at 15:21

## 2019-06-10 RX ADMIN — HYDROMORPHONE HYDROCHLORIDE 0.5 MG: 1 INJECTION, SOLUTION INTRAMUSCULAR; INTRAVENOUS; SUBCUTANEOUS at 00:31

## 2019-06-10 RX ADMIN — LORAZEPAM 1 MG: 2 LIQUID ORAL at 19:12

## 2019-06-10 RX ADMIN — HYDROMORPHONE HYDROCHLORIDE 0.5 MG: 1 INJECTION, SOLUTION INTRAMUSCULAR; INTRAVENOUS; SUBCUTANEOUS at 09:49

## 2019-06-10 RX ADMIN — LORAZEPAM 1 MG: 2 INJECTION INTRAMUSCULAR; INTRAVENOUS at 02:19

## 2019-06-10 RX ADMIN — LORAZEPAM 1 MG: 2 INJECTION INTRAMUSCULAR; INTRAVENOUS at 10:44

## 2019-06-10 ASSESSMENT — ACTIVITIES OF DAILY LIVING (ADL)
ADLS_ACUITY_SCORE: 34

## 2019-06-10 NOTE — DISCHARGE SUMMARY
[facility]  Discharge Summary  Hospitalist      Date of Admission:  6/8/2019  Date of Discharge:  6/11/2019  Provider:  Karol Mckeon MD  Date of Service (when I last saw the patient): 06/10/19      Primary Provider: Ty Carter          Discharge Diagnosis:   Discharge Diagnoses   Stage IV known small cell cancer.  Patient is terminally ill  Hospice care  Comfort measures    Other medical issues:  Past Medical History:   Diagnosis Date     Cancer (H)      Myocardial infarction (H)           History of Present Illness   Ramez Frye is an 63 year old male who is brought to the hospital for inpatient comfort cares.  Please see the admission history and physical for full details.    Hospital Course     Ramez Frye was admitted on 6/8/2019. He is a 63 year old male with past medical history significant for stage IV non-small cell cancer previously treated with chemo radiation and Keytruda developed pulmonary toxicity and with worsening clinical situation was made hospice.  Patient was managed at home under supervision of hospice team however patient was increasingly agitated and required high doses of pain meds.  He was sent via EMS to help with comfort cares as inpatient.  Admitted for comfort cares.  Agitated increase Dilaudid and Ativan.  Social work consulted to help with hospice placement. Palliative team consulted to help with hospice meds while in hospital.       The following problems were addressed during his hospitalization:    Stage IV non-small cell cancer  --Patient now on comfort care as an under hospice care  --Dilaudid infusion and as needed boluses for agitation and comfort  --Thorazine IM for agitation as recommended by hospice director  --Social work consulted for residential hospice plans     History of CAD  Patient is on hospice no acute assessment          Significant Results and Procedures   As above    Pending Results   Unresulted Labs Ordered in the Past 30  Days of this Admission     No orders found from 4/9/2019 to 6/9/2019.          Code Status   Comfort Care       Primary Care Physician   Ty Carter    Physical Exam   Temp: 96.6  F (35.9  C) Temp src: Temporal       Resp: 19 SpO2: 93 % O2 Device: Nasal cannula Oxygen Delivery: 3 LPM  Vitals:    06/08/19 2331   Weight: 63.5 kg (140 lb)     Vital Signs with Ranges  Temp:  [96.6  F (35.9  C)-98.5  F (36.9  C)] 96.6  F (35.9  C)  Resp:  [19-30] 19  SpO2:  [85 %-93 %] 93 %  No intake/output data recorded.       Gen: Somnolent and restless. Otherwise, in no acute distress.  HEENT: NCAT. EOMI. PERRL.  Neck: Normal inspection. No bruit, JVD or thyromegaly.  Lungs: Normal respiratory effort.  Story but sounds clear  Card: N s1s2. RRR. No M/R/G.  Peripheral pulses present and symmetric.   Skin: No rash. Warm to the touch  Extr: No edema. CMS intact  Psychiatric: Unable to assess  Neurologic: Unable to assess            Discharge Disposition   Discharged to short-term care facility    Consultations This Hospital Stay   SOCIAL WORK IP CONSULT  SOCIAL WORK IP CONSULT  PALLIATIVE CARE ADULT IP CONSULT  PALLIATIVE CARE ADULT IP CONSULT    Time Spent on this Encounter   I, Karol Mckeon, personally saw the patient today and spent greater than 30 minutes discharging this patient.    Discharge Orders      General info for SNF    Length of Stay Estimate: Short Term Care: Estimated # of Days <30  Condition at Discharge: Declining  Level of care:skilled   Rehabilitation Potential: Poor  Admission H&P remains valid and up-to-date: Yes  Recent Chemotherapy: N/A  Use Nursing Home Standing Orders: Yes     Mantoux instructions    Give two-step Mantoux (PPD) Per Facility Policy Yes     Reason for your hospital stay    Patient on hospice was brought to ED as required higher pain meds and was difficult to manage at home     Additional Discharge Instructions    PLEASE DONT TURN PT ON HIS RT SIDE. UNCOMFORTABLE WHEN TURNED TO RT      Wound care    Site:   [email or web address]     Special Code (specify)    COMFORT CARES     Advance Diet as Tolerated    Follow this diet upon discharge: Orders Placed This Encounter      Comfort  Foods if patient awake enough. HE IS SOMNOLENT, SEDATED and NPO for Medical/Clinical Reasons Except for: Meds, Ice Chips     Discharge Medications   Current Discharge Medication List      START taking these medications    Details   acetaminophen (TYLENOL) 650 MG suppository Place 1 suppository (650 mg) rectally every 4 hours as needed for mild pain or fever  Qty: 30 suppository, Refills: 0    Associated Diagnoses: Hospice care patient      artificial saliva (BIOTENE MT) SOLN solution Take 2 mLs (2 sprays) by mouth every hour as needed for dry mouth  Qty: 1 Bottle, Refills: 0    Associated Diagnoses: Hospice care patient      atropine 1 % ophthalmic solution Place 1-2 drops under the tongue every hour as needed (secretions)  Qty: 1 Bottle, Refills: 0    Associated Diagnoses: Hospice care patient      bisacodyl (DULCOLAX) 10 MG suppository Place 1 suppository (10 mg) rectally once as needed (for no bowel movement for 72 hours)  Qty: 30 suppository, Refills: 0    Associated Diagnoses: Hospice care patient      hypromellose-dextran (ARTIFICAL TEARS) 0.1-0.3 % ophthalmic solution Place 1-2 drops into both eyes every 8 hours as needed for dry eyes  Qty: 30 mL, Refills: 0    Associated Diagnoses: Hospice care patient      ondansetron (ZOFRAN-ODT) 4 MG ODT tab Take 1 tablet (4 mg) by mouth every 6 hours as needed for nausea or vomiting  Qty: 30 tablet, Refills: 0    Associated Diagnoses: Hospice care patient      S/l pain meds and anxiolytics as ordered     Allergies   Allergies   Allergen Reactions     Penicillins Rash     Has used amoxicillin without problem       Data   Most Recent 3 CBC's:No lab results found.   Most Recent 3 BMP's:No lab results found.  Most Recent 2 LFT's:No lab results found.  Most Recent INR's and Anticoagulation  Dosing History:  Anticoagulation Dose History     There is no flowsheet data to display.        Most Recent 3 Troponin's:No lab results found.  Most Recent Cholesterol Panel:No lab results found.  Most Recent 6 Bacteria Isolates From Any Culture (See EPIC Reports for Culture Details):No lab results found.  Most Recent TSH, T4 and A1c Labs:No lab results found.  Results for orders placed or performed during the hospital encounter of 11/09/18   US Lower Extremity Venous Duplex Bilateral    Narrative    US LOWER EXTREMITY VENOUS DUPLEX BILATERAL  11/9/2018 4:46 PM    HISTORY:  ; Swollen; Bilateral calf pain; Bilateral calf pain;  Redness; History of DVT (deep vein thrombosis)    TECHNIQUE:  Venous Doppler US including color flow and Doppler  waveform analysis.    FINDINGS: No thrombus was observed and there was normal  compressibility, phasic flow, and augmentation.       Impression    IMPRESSION: No evidence of  right or left lower extremity deep venous  thrombosis.    CATRINA GIBSON MD           Disclaimer: This note consists of symbols derived from keyboarding, dictation and/or voice recognition software. As a result, there may be errors in the script that have gone undetected. Please consider this when interpreting information found in this chart.

## 2019-06-10 NOTE — PROGRESS NOTES
.  [facility]    Hospitalist Progress Note  Name: Ramez Frye    MRN: [id number]  Provider: Karol Mckeon MD  Date of Service: 06/10/2019    Assessment & Plan   Summary of Stay: Ramez Frye is a 63 year old male with past medical history significant for stage IV non-small cell cancer previously treated with chemo radiation and Keytruda developed pulmonary toxicity and with worsening clinical situation was made hospice.  Patient was managed at home under supervision of hospice team however patient was increasingly agitated and required high doses of pain meds.  He was sent via EMS to help with comfort cares as inpatient.  Admitted for comfort cares.  Agitated increase Dilaudid and Ativan.  Social work consulted to help with hospice placement.  Palliative team consulted to help with hospice meds while in hospital.    Stage IV non-small cell cancer  --Patient now on comfort care as an under hospice care  --Dilaudid infusion and as needed boluses for agitation and comfort  --Thorazine IM for agitation as recommended by hospice director  --Social work consulted for residential hospice plans    History of CAD  Patient is on hospice no acute assessment        DVT Prophylaxis: Not indicated  Code Status: DNR/DNI    Disposition: Expected discharge social work consulted to help with residential hospice care      Interval History   Assumed care reviewed chart.  Patient sedated yet agitated.  He has been restless.  He does have a sacral wound.  Significant pain with changing of position.  Unable to get review of systems    -Data reviewed today: I reviewed all new labs and imaging reports over the last 24 hours. I personally reviewed no images or EKG's today.    Physical Exam   Temp: 96.6  F (35.9  C) Temp src: Temporal       Resp: 20 SpO2: 93 % O2 Device: Nasal cannula Oxygen Delivery: 3 LPM  Vitals:    06/08/19 2331   Weight: 63.5 kg (140 lb)     Vital Signs with Ranges  Temp:  [96.6  F (35.9  C)-100.9  F  (38.3  C)] 96.6  F (35.9  C)  Resp:  [20-30] 20  SpO2:  [80 %-93 %] 93 %  No intake/output data recorded.    Gen: Somnolent and restless. Otherwise, in no acute distress.  HEENT: NCAT. EOMI. PERRL.  Neck: Normal inspection. No bruit, JVD or thyromegaly.  Lungs: Normal respiratory effort.  Story but sounds clear  Card: N s1s2. RRR. No M/R/G.  Peripheral pulses present and symmetric.   Skin: No rash. Warm to the touch  Extr: No edema. CMS intact  Psychiatric: Unable to assess  Neurologic: Unable to assess        Medications     - MEDICATION INSTRUCTIONS -       HYDROmorphone 0.4 mg/hr (06/10/19 2485)       Data     No results for input(s): WBC, HGB, HCT, MCV, PLT in the last 168 hours.  No results for input(s): NA, POTASSIUM, CHLORIDE, CO2, ANIONGAP, GLC, BUN, CR, GFRESTIMATED, GFRESTBLACK, HITESH in the last 168 hours.  No results for input(s): CULT in the last 168 hours.  No results for input(s): NTBNPI, NTBNP in the last 168 hours.  No results for input(s): AST, ALT, GGT, ALKPHOS, BILITOTAL, BILICONJ, BILIDIRECT, ALYSSA in the last 168 hours.    Invalid input(s): BILIRUBININDIRECT  No results for input(s): INR in the last 168 hours.  No results for input(s): LACT in the last 168 hours.  No results for input(s): LIPASE in the last 168 hours.  No results for input(s): TROPONIN, TROPI, TROPR in the last 168 hours.    Invalid input(s): TROP, TROPONINIES  No results for input(s): COLOR, APPEARANCE, URINEGLC, URINEBILI, URINEKETONE, SG, UBLD, URINEPH, PROTEIN, UROBILINOGEN, NITRITE, LEUKEST, RBCU, WBCU in the last 168 hours.    No results found for this or any previous visit (from the past 24 hour(s)).

## 2019-06-10 NOTE — CONSULTS
[facility]    Palliative Care Consultation   Text Page    Date of Admission:  6/8/2019    Assessment & Plan   Ramez Frye is a 63 year old male who was admitted on 6/8/2019. I was asked by Hospitalist Karol Mckeon MD to see the patient for hospice pain meds.    Recommendations:  1.  Decisional Capacity -  Unreliable. Patient has an advance directive dated 2/25/2019.  Consents and decision making should be done by his wife Emma Frye, his primary Health Care Agent.  Alternate health care agent is his son Taj Frye. POLST dates 3/11/2019 DNR/DNI - Comfort focused care    2.  Pain -  Currently on IV Dilaudid 0.72 mg/hr continuous rate and Dilaudid IV 1 mg every hour PRN for comfort.  During the past day he has used 15 mg IV Dilaudid so it is reasonable to convert to 12 mg sublingual Dilaudid every 4 hours and use 4 mg sublingual Dilaudid every 1 hour as needed for breakthrough pain, dyspnea or respiratory rate greater than 20.    3.  Agitation and myoclonic jerking - During the past day he was given 50 mg IM Thorazine and two doses of 1 mg IV Ativan. Reasonable to schedule Ativan 1 mg every 4 hours for comfort.    4. Spiritual Care - Spiritual Health services declined at this time.     4. Care Planning- Appreciate input of  Corinne White, LSW in dismissal planning.     Goals of Care: DNR/DNI - Comfort care.  Plan for dismissal to [facility] home tomorrow.     Disease Process/es & Symptoms:  Ramez Frye is a 63 year old [facility] patient admitted for symptom control as he was agitated at home. He has a history of stage IV non-small cell cancer which he previously had chemo and radiation and last year was on Keytruda which was felt to cause pulmonary toxicity and he enrolled in [facility].      Findings & plan of care discussed with: Hospitalist Karol Mckeon MD, bedside nurse Sydnie Salazar RN; , Corinne White, LSW; and [facility]  " JULIANNE Perez.   Follow-up plan from palliative team: Will follow closely during this hospitalization.   Thank you for involving us in the patient's care.     Funmi Dietrich MS, RN, CNS, APRN, ACHPN, FAACVPR  Pain and Palliative Care  Pager [phone]  Office [phone]     Total unit/floor time from 1:45 PM until 3:50 PM consisted of the following, examination of the patient, reviewing the record and completing documentation. 1 hour 20 minutes spent counseling with family consisted of the following topics, education about prognosis and symptom management.  Time spent in coordination of care with Bedside Nurse Sydnie Salazar RN, Hospitalist Karol Mckeon MD and Hospice Team.        Primary Care Physician   Ty Carter    Chief Complaint   Agitation    History is obtained from the electronic health record and patient's family    Decision-Making & Goals of Care:  Discussed on Verito 10, 2019 with Funmi MCFARLAND, CNS:     Patient resting comfortably in bed as I discussed his care with his wife/healthcare agent Emma Frye; two sons (of his three sons) and 4 year old granddaughter. Emma was diligently completing paperwork for Ben's [employer] benefits when I arrived. Appreciate  Corinne White, LSW for joining our discussion to let the family know of the bed availability at [facility]. Emma explained that on Friday they transitioned Ben's medications from oral to sublingual because he was having difficulty swallowing the applesauce she placed them in. She places blame on the applesauce for Ben's recent loose stools as he had a large loose stool on Saturday, but none since. Taj was surprised Friday night when Ben got out of bed to go to the bathroom. They were dismayed by his agitation and pain as historically Ben has not been able to lay flat due to his substantial pain. Emma explained \"Ben always said he wanted to die peacefully in his sleep. Whatever we " "can do to have him die in peace would be the best\". She explained that their wiener-bassdaniel hound mix dog Araceli is missing Ben at home. She acknowledged that Ben  Is proud of his son Taj for caring for him at home. He has been source of strength for her during this difficult time.        Past Medical History   I have reviewed this patient's medical history and updated it with pertinent information if needed.   Past Medical History:   Diagnosis Date     Cancer (H)      Myocardial infarction (H)        Past Surgical History   I have reviewed this patient's surgical history and updated it with pertinent information if needed.  No past surgical history on file.    Prior to Admission Medications   None     Allergies   Allergies   Allergen Reactions     Penicillins Rash     Has used amoxicillin without problem         Social History   Living situation: His wife Emma and son Taj have cared for him at home  Family system: Wife, three sons, and 6 grandchildren (children are from previous marriage as Ben has been with Emma for 16 years  Functional status: Needs help with all ADLs   Employment/education: Retired from [employer] office one month before his cancer diagnosis  Use of community resources: [facility]  History of substance use/abuse:  has no tobacco history on file.  has no alcohol history on file.  Scientologist affiliation: [employer]    Family History   I have reviewed this patient's family history and updated it with pertinent information if needed.   No family history on file.    Review of Systems   Review of systems not obtained due to patient factors - mental status    Physical Exam   Temp:  [96.6  F (35.9  C)-98.5  F (36.9  C)] 96.6  F (35.9  C)  Resp:  [20-30] 20  SpO2:  [85 %-93 %] 93 %  140 lbs 0 oz  GEN:  Comatose, appears comfortable, NAD.  HEENT:  Normocephalic/atraumatic, no scleral icterus, no nasal discharge, mouth moist.  CV:  RRR, S1, S2; no murmurs or other irregularities noted.  +3 " DP/PT pulses bilaterally; no edema BLE.  RESP:  Clear to auscultation bilaterally without rales/rhonchi/wheezing/retractions.  Symmetric chest rise on inhalation noted.  Normal respiratory effort.  ABD:  Rounded, soft, non-tender/non-distended.  +BS  SKIN:  Left hand is cool and mottled with right hand warm and not mottled. Skin on feet to knees is coarse and wrinkled (patient has history of severe lymphedema) and cool to the touch.  NEURO: Deferred noting intermittent jerking of arms and legs (myoclonus).      Data   Results for orders placed or performed during the hospital encounter of 06/08/19   Social Work IP Consult    Narrative    Rebecca Escobar TYRONE     6/9/2019  4:15 PM  Care Transition Initial Assessment - SW     Met with: Patient and Family    Active Problems:    Metastatic lung cancer (metastasis from lung to other site) (H)       DATA  Lives With: spouse      Quality of Family Relationships: helpful, involved, supportive  Description of Support System: Supportive, Involved  Who is your support system?: Wife  Support Assessment: Adequate family and caregiver support,   Adequate social supports.   Identified issues/concerns regarding health management: Discharge   planning      Quality of Family Relationships: helpful, involved, supportive  ASSESSMENT  Cognitive Status:  non-responsive  Concerns to be addressed: Patient resides with his spouse.   Patient is enrolled in  hospice. Wife reported herself and son   has been providing all cares for patient. Pt wife and son   reported they are not sure if they can adequately care for pt at   lone due to agitation and pain. Pt wife reported if his pain and   agitation can me manage then pt preference is to die at home.   Discussed hospice homes. Patient wife reported she need to talk   to hospice nurse Ana at [phone] on monday for her   recommends. [facility] following patient.     PLAN  Continue to follow for discharge planning.        Social Work IP  Consult    Narrative    White, Corinne C, GERBER     6/10/2019 11:52 AM  Care Transition Initial Assessment - SW     Met with: Family  Wife and Hospice SW Marcella on the phone   Active Problems:    Metastatic lung cancer (metastasis from lung to other site) (H)       DATA  Lives With: spouse - home setting      Quality of Family Relationships: helpful, involved, supportive  Description of Support System: Supportive, Involved  Who is your support system?: Wife, Children  Support Assessment: Adequate family and caregiver support,   Adequate social supports. PT was admitted from home as a Hospice   admission due to agitation   Identified issues/concerns regarding health management: PT will   nned placement to a Residential type facility    @   Resources List: Hospice  [facility] following. Marcella STAFFORD [phone] ok with sw from [facility]   supporting d.c planning need    Quality of Family Relationships: helpful, involved, supportive     ASSESSMENT  Cognitive Status:  Not able to participate.   Concerns to be addressed: Met with wife. Emma. RIVERA able to   confirm that pt is a . Wife is not sure if they have   registered with the  [facility] for support services, she does have copy   of his DD2 discharge paper work   Emailed the [facility] to see if pt is registered.  Called [facility]. Gemma Rn has paper work and reviewing      PLAN  PT is a Hospice admission. Working on placement       CM: Addendum;  [facility] is not [facility] with [facility] yet. Provided paper work to wife   to complete  Explained that pt may need a Deposit while the [facility] if reviewing   paper work.. Wife stated she understood  Called Gemma at [facility] to update that this writer will be primary   contact for d.c planning support

## 2019-06-10 NOTE — PLAN OF CARE
O2 for comfort  LS dim. Resp shallow  Secretions managed with Atropine gtts  Pt agitated this AM, Hospice nurse here and gave MD new order recommendations  PIV infusing continuous IV Dilaudid 1.8 ml/hr  NPO with ice and meds  Assist of 2 with repo/changing  Pt's family does not want pt repo on sides  Pt agitated at times, Ativan given  Palliative changing pain med orders, see MAR  Plan: [facility] by 10:00 am tomorrow if ok to transport and pain plan works.

## 2019-06-10 NOTE — PLAN OF CARE
Pt on comfort cares.  Pt somnolent, restless/agitated, thorazine, ativan & extra doses of dilaudid given.  On continuous IV dilaudid  1 ml/hr  On O2 for comfort.  Pressure wound to sacral area, mepilex c/d/i  Incontinent, changed x3  Per spouse request; pt was not repositioned & during check & change, turned to left side only.  Pt appears comfortable at this time.

## 2019-06-10 NOTE — CONSULTS
Care Transition Initial Assessment - SW     Met with: Family  Wife and Hospice SW Marcella on the phone   Active Problems:    Metastatic lung cancer (metastasis from lung to other site) (H)       DATA  Lives With: spouse - home setting      Quality of Family Relationships: helpful, involved, supportive  Description of Support System: Supportive, Involved  Who is your support system?: Wife, Children  Support Assessment: Adequate family and caregiver support, Adequate social supports. PT was admitted from home as a Hospice admission due to agitation   Identified issues/concerns regarding health management: PT will nned placement to a Residential type facility    @   Resources List: Hospice   Hospice following. Marcella STAFFORD [phone] ok with sw from [facility] supporting d.c planning need    Quality of Family Relationships: helpful, involved, supportive     ASSESSMENT  Cognitive Status:  Not able to participate.   Concerns to be addressed: Met with wife. Emma. RIVERA able to confirm that pt is a . Wife is not sure if they have registered with the  VA for support services, she does have copy of his DD2 discharge paper work   Emailed the [facility] to see if pt is registered.  Called [facility] home. Gemma Rn has paper work and reviewing      PLAN  PT is a Hospice admission. Working on placement       CM: Addendum;  PT is not register with [facility] yet. Provided paper work to wife to complete  Explained that pt may need a Deposit while the [facility] if reviewing paper work.. Wife stated she understood  Called Gemma at [facility] home to update that this writer will be primary contact for d.c planning support       Cm :Spoke with Gemma at [facility]. They have bed for tomorrow. Updated family. They are ok with location and plan.   H/E stretcher transport set up for 9:00am  Called Thu to update  Left  message with RIVERA from  Hospice

## 2019-06-11 VITALS
RESPIRATION RATE: 20 BRPM | SYSTOLIC BLOOD PRESSURE: 96 MMHG | HEIGHT: 68 IN | HEART RATE: 99 BPM | OXYGEN SATURATION: 91 % | WEIGHT: 140 LBS | TEMPERATURE: 96.6 F | BODY MASS INDEX: 21.22 KG/M2 | DIASTOLIC BLOOD PRESSURE: 61 MMHG

## 2019-06-11 PROCEDURE — 25000132 ZZH RX MED GY IP 250 OP 250 PS 637: Performed by: CLINICAL NURSE SPECIALIST

## 2019-06-11 PROCEDURE — 99232 SBSQ HOSP IP/OBS MODERATE 35: CPT | Mod: GV | Performed by: CLINICAL NURSE SPECIALIST

## 2019-06-11 PROCEDURE — 25000128 H RX IP 250 OP 636: Performed by: CLINICAL NURSE SPECIALIST

## 2019-06-11 RX ADMIN — LORAZEPAM 2 MG: 2 INJECTION, SOLUTION INTRAMUSCULAR; INTRAVENOUS at 09:19

## 2019-06-11 RX ADMIN — HYDROMORPHONE HYDROCHLORIDE 12 MG: 10 INJECTION, SOLUTION INTRAMUSCULAR; INTRAVENOUS; SUBCUTANEOUS at 06:06

## 2019-06-11 RX ADMIN — HYDROMORPHONE HYDROCHLORIDE 4 MG: 10 INJECTION, SOLUTION INTRAMUSCULAR; INTRAVENOUS; SUBCUTANEOUS at 09:02

## 2019-06-11 RX ADMIN — LORAZEPAM 1 MG: 2 LIQUID ORAL at 02:11

## 2019-06-11 RX ADMIN — LORAZEPAM 1 MG: 2 LIQUID ORAL at 06:06

## 2019-06-11 RX ADMIN — HYDROMORPHONE HYDROCHLORIDE 12 MG: 10 INJECTION, SOLUTION INTRAMUSCULAR; INTRAVENOUS; SUBCUTANEOUS at 02:11

## 2019-06-11 ASSESSMENT — ACTIVITIES OF DAILY LIVING (ADL)
ADLS_ACUITY_SCORE: 34

## 2019-06-11 NOTE — PLAN OF CARE
Pt on comfort cares. Scheduled pain medication started at 1530; PCA pain pump stopped at 1630. Pt repositioned and changed x 1. Oral care provided. Sacral dressing on coccyx. PIV saline locked. Potential discharge to facility tomorrow.

## 2019-06-11 NOTE — PLAN OF CARE
PT on comfort cares.  Discharging to hospice  Mepilex dressing changed  Sublingual pain meds given immediately prior to transfer. PRN IV Ativan given  AVS copy given to wife  All belongings accounted for  IV dc'd  Pt dc'd via stretcher with [facility] as transport  Family to follow to Hospice facility

## 2019-06-11 NOTE — PROGRESS NOTES
"[facility]  Palliative Care Progress Note  Text Page    Joined bedside nurse Sydnie Salazar RN in her discussion at bedside with the patient's wife Emma and son Taj. They were please at how well things had gone last evening. Emma remarked \"This is the best he has looked in days\". Myoclonic jerking is resolved and patient looks comfortable with regular respirations and regular pulse. Patient is stable for transport at 9 AM [facility].      Assessment & Plan   1.  Decisional Capacity - Unreliable as patient is comatose. Patient has an advance directive dated 2/25/2019.  Consents and decision making should be done by his wife Emma Frye, his primary Health Care Agent.  Alternate health care agent is his son Taj Frye. POLST dates 3/11/2019 DNR/DNI - Comfort focused care     2.  Pain -  During the past day he has used 12.1 mg IV Dilaudid and 60 mg sublingual Dilaudid . Continue 12 mg sublingual Dilaudid every 4 hours and use 4 mg sublingual Dilaudid every 1 hour as needed for breakthrough pain, dyspnea or respiratory rate greater than 20.     3.  Agitation and myoclonic jerking - Resolved with scheduled Ativan 1 mg sublingual every 4 hours for comfort.     4. Spiritual Care - Spiritual Health services declined at this time.      5. Care Planning- Appreciate input of  Corinne White, LSW in dismissal planning.      Goals of Care: DNR/DNI - Comfort care.  Plan for dismissal to [facility] home later this morning.     Funmi Dietrich MS, RN, CNS, APRN, ACHPN, FAACVPR  Pain and Palliative Care  Pager [phone]  Office [phone]       Total unit/floor time from 8:05 AM until 8:30 AM consisted of the following, examination of the patient, reviewing the record and completing documentation. >50% of time spent in counseling and coordination of care.  20 minutes spent counseling with family consisted of the following topics, education about " prognosis and symptom management.  Time spent in coordination of care with Bedside Nurse Sydnie Salazar RN.      Interval History   Chart reviewed - patient has no further issues with pain and agitation    Review of Systems     Unable as patient is comatose       Physical Exam   Heart Rate:  [] 104  Resp:  [19-20] 20  SpO2:  [91 %-98 %] 91 %  140 lbs 0 oz  GEN:  Comatose, appears comfortable, no apparent distress.  HEENT:  Normocephalic/atraumatic, no nasal discharge, mouth moist.  CV:  RRR, S1, S2; no murmurs or other irregularities noted.  +3 DP/PT pulses bilaterally; no edema BLE.  RESP:  Anterior chest is clear to auscultation bilaterally without rales/rhonchi/wheezing/retractions.  Symmetric chest rise on inhalation noted.  Normal respiratory effort.  ABD:  Rounded, soft, non-tender/non-distended.  +BS  SKIN:  Warm and dry to touch, no mottling.      Medications     - MEDICATION INSTRUCTIONS -       HYDROmorphone Stopped (06/10/19 1635)       - DC PCA -   Does not apply See Admin Instructions     HYDROmorphone  12 mg Sublingual Q4H     LORazepam  1 mg Oral Q4H       Data   Results for orders placed or performed during the hospital encounter of 06/08/19 (from the past 24 hour(s))   Social Work IP Consult    Narrative    White, Corinne C, GERBER     6/10/2019  2:11 PM  Care Transition Initial Assessment - SW     Met with: Family  Wife and Hospice SW Marcella on the phone   Active Problems:    Metastatic lung cancer (metastasis from lung to other site) (H)       DATA  Lives With: spouse - home setting      Quality of Family Relationships: helpful, involved, supportive  Description of Support System: Supportive, Involved  Who is your support system?: Wife, Children  Support Assessment: Adequate family and caregiver support,   Adequate social supports. PT was admitted from home as a Hospice   admission due to agitation   Identified issues/concerns regarding health management: PT will   nned placement to a  Residential type facility    @   Resources List: Hospice   Hospice following. Marcella STAFFORD [phone] ok with los from [facility]   supporting d.c planning need    Quality of Family Relationships: helpful, involved, supportive     ASSESSMENT  Cognitive Status:  Not able to participate.   Concerns to be addressed: Met with wife. Emma. LOS able to   confirm that pt is a . Wife is not sure if they have   registered with the  VA for support services, she does have copy   of his DD2 discharge paper work   Emailed the [facility] to see if pt is registered.  Called [facility] home. Gemma Rn has paper work and reviewing      PLAN  PT is a Hospice admission. Working on placement       CM: Addendum;  PT is not register with [facility] yet. Provided paper work to wife   to complete  Explained that pt may need a Deposit while the [facility] if reviewing   paper work.. Wife stated she understood  Called Gemma at [facility] to update that this writer will be primary   contact for d.c planning support       Cm :Spoke with Gemma at [facility]. They have bed for tomorrow. Updated   family. They are ok with location and plan.   H/E stretcher transport set up for 9:00am  Called Thu to update  Left  message with LOS from  Hospice

## 2019-06-11 NOTE — PLAN OF CARE
Pt on comfort cares. Somnolent. 3L supplemental O2 via NC. Pt comfortable w/ scheduled sublingual ativan and dilaudid. Producing small amount of urine; incontinence brief changed. Son sleeping at bedside. Plan to discharge this AM to hospice facility.

## 2019-06-11 NOTE — PROGRESS NOTES
Discharge Planner   Discharge Plans in progress: discharge orders faxed to [facility]   Barriers to discharge plan: none  Follow up plan: Stretcher transport @ 9:00 a,m with H/E       Entered by: Corinne C. White 06/11/2019 8:31 AM

## 2019-12-05 NOTE — PROGRESS NOTES
Outpatient Occupational Therapy Discharge Note     Patient: Ramez Frye  : 1955    Beginning/End Dates of Reporting Period:  18 to 2019    Referring Provider: Dr. Ziegler    Therapy Diagnosis: non small cell lung cancer    Client Self Report:       Objective Measurements: none taken                                                        Outcome Measures (most recent score):  Lymphedema Life Impact Scale (score range 0-72). A higher score indicates greater impairment.: 40-pre score; no post score as pt did not return for services    Goals:   Goal Identifier 1   Goal Description In order to improve functional mobility for activities of living, by the completion of intensive treatment,  patient and/or caregiver will:   Target Date 19   Date Met      Progress:     Goal Identifier 1a   Goal Description tolerate gradient compression bandaging/wearing compression garments 23 hrs/day to prevent re-acccumulation of extracellular fluid for reductions needed to aide improvements in LB cares, functional mobility, and transfer Ind   Target Date 19   Date Met      Progress:     Goal Identifier 1b   Goal Description demonstrate independence in applying gradient compression bandages to build I with home management of BLE/trunkal lymphedema needed to aide improvements in transfers, mobility, and LB cares   Target Date 19   Date Met      Progress:     Goal Identifier 1c   Goal Description demonstrate independence in performing prescribed exercises to facilate the lymph system and muscle pumping system for max reductions needed to aide imporvements in functional mobility, transfers, and LB cares   Target Date 19   Date Met      Progress:     Goal Identifier 1d   Goal Description be independent in donning/doffing, wearing schedule, and care of compression garments for I building with home management of BLE/trunkal lymphedema needed to aide improvements in mobility, LB cares, and transfer Ind    Target Date 03/01/19   Date Met      Progress:     Goal Identifier 2   Goal Description Display total BLE volume reduction of 1L+ for reductions needed to aide improvements in mobility, LB cares, and functional transfers   Target Date 03/01/19   Date Met      Progress:     Goal Identifier     Goal Description     Target Date     Date Met      Progress:     Goal Identifier     Goal Description     Target Date     Date Met      Progress:     Progress Toward Goals:   Pt was evaluated for lymphedema services and multiple reasons for BLE and trunkal lymphedema identified at time eval. Pt reported at time eval unsure if able to align transportation for all the OP clinic times needed for therapy. Pt has not returned for services. Pt to be discharged.      Plan:  Discharge from therapy.    Discharge:    Reason for Discharge: Patient chooses to discontinue therapy.  Patient has failed to schedule further appointments.    Equipment Issued:     Discharge Plan: pt to return for services as needed/desired

## 2019-12-05 NOTE — ADDENDUM NOTE
Encounter addended by: Alec Ricks OT on: 12/5/2019 10:47 AM   Actions taken: Clinical Note Signed, Episode resolved

## 2021-05-30 VITALS — BODY MASS INDEX: 30.21 KG/M2 | HEIGHT: 69 IN | WEIGHT: 204 LBS

## 2021-05-31 ENCOUNTER — RECORDS - HEALTHEAST (OUTPATIENT)
Dept: ADMINISTRATIVE | Facility: CLINIC | Age: 66
End: 2021-05-31

## 2021-06-09 ENCOUNTER — RECORDS - HEALTHEAST (OUTPATIENT)
Dept: ADMINISTRATIVE | Facility: CLINIC | Age: 66
End: 2021-06-09

## 2021-06-09 NOTE — ANESTHESIA CARE TRANSFER NOTE
Last vitals:   Vitals:    02/22/17 0801   BP: 108/60   Pulse: 65   Resp: 15   Temp: 36.6  C (97.8  F)   SpO2: 99%     Patient's level of consciousness is drowsy  Spontaneous respirations: yes  Maintains airway independently: yes  Dentition unchanged: yes  Oropharynx: oropharynx clear of all foreign objects    QCDR Measures:  ASA# 20 - Surgical Safety Checklist: ASA20A - Safety Checks Done  PQRS# 430 - Adult PONV Prevention: 4558F - Pt received => 2 anti-emetic agents (different classes) preop & intraop  ASA# 8 - Peds PONV Prevention: NA - Not pediatric patient, not GA or 2 or more risk factors NOT present  PQRS# 424 - Karen-op Temp Management: 4559F - At least one body temp DOCUMENTED => 35.5C or 95.9F within required timeframe  PQRS# 426 - PACU Transfer Protocol: - Transfer of care checklist used  ASA# 14 - Acute Post-op Pain: ASA14B - Patient did NOT experience pain >= 7 out of 10    I completed my SBAR handoff to the receiving nurse per policy and procedure.
